# Patient Record
(demographics unavailable — no encounter records)

---

## 2024-10-30 NOTE — HISTORY OF PRESENT ILLNESS
[de-identified] : 9/4/2024 Mild gastritis, severe duodenitis with scattered small superficial ulcerations, approximately 1.5 cm nodular prominence with overlying mucosal inflammation in close proximity to ampulla, cannot rule out a submucosal lesion (bx --> minute fragment of superficial duodenal mucosa with granulation tissue, erosion and reactive epithelial cell changes) [de-identified] : 2022 -- CBD stone with stricture s/p stent placed and removed -- Dr. Christianson [FreeTextEntry1] : 2/13/2024 Gallbladder normal, no cholelithiasis or cholecystitis, no biliary dilation, no choledocholithiasis or stricture; unremarkable pancreas

## 2024-10-30 NOTE — ASSESSMENT
[FreeTextEntry1] : 68 yo male with approximately 1.5 cm nodular prominence with overlying mucosal inflammation in close proximity to ampulla, cannot rule out a submucosal lesion  - EGD-EUS with duodenoscope at [facility] - D/w pt regarding escort post-procedure - Risks of the procedure including bleeding, perforation, etc d/w the patient - NPO after midnight

## 2024-10-30 NOTE — REASON FOR VISIT
[Initial Evaluation] : an initial evaluation [[employer] ] : provided by [employer]   [Interpreters_IDNumber] : [id number] [Interpreters_FullName] : Carlos [TWNoteComboBox1] : Japanese

## 2025-03-12 NOTE — ASSESSMENT
[FreeTextEntry1] : 69 yo male with hx of chronic pancreatitis, presents for f/u post EGD/EUS 12/9/24 and CT 12/31/24 with findings of calcified 2.4 x 1.2 cm structure in the pancreatic head likely reflect sequela of chronic pancreatitis.   #R sided Pain/#Chronic Pancreatitis/#Structure in Pancreatic Head on Recent CT/#Prominent Papilla on recent CT -CA 19-9 ordered and hereditary pancreatic panel -Creon dose reviewed and in chart: pt states he takes 3 pills/daily -Pancreatic fecal elastase ordered to assess need for cx in Creon dosing  #Recent weakness, N/V/D, feverish feeling -C/o of these symptoms over the past week -Explained to pt he most likely has/had viral illness -Instructed to f/u with our office or PCP if s/s do not improve over next week  F/u in 3 months  IDenisa NP, am scribing for and in the presence of Dr. Colin Ohara the following sections: history of present illness, past medical/family/social history; review of systems; vital signs; physical exam; disposition.  IColin MD, personally performed the services described in the documentation, reviewed the documentation recorded by the scribe in my presence and it accurately and completely records my words and actions.

## 2025-03-12 NOTE — REASON FOR VISIT
[Follow-up] : a follow-up of an existing diagnosis [[employer] ] : provided by [employer]   [Interpreters_IDNumber] : [id number] [Interpreters_FullName] : Galilea [TWNoteComboBox1] : Botswanan

## 2025-03-12 NOTE — REVIEW OF SYSTEMS
[Abdominal Pain] : abdominal pain [Vomiting] : vomiting [Diarrhea] : diarrhea [Heartburn] : heartburn [Fever] : no fever [Chills] : no chills [Recent Weight Gain (___ Lbs)] : no recent weight gain [Recent Weight Loss (___ Lbs)] : no recent weight loss [Constipation] : no constipation [Melena (black stool)] : no melena [Bleeding] : no bleeding [Fecal Incontinence (soiling)] : no fecal incontinence [Bloating (gassiness)] : no bloating

## 2025-04-16 NOTE — LETTER BODY
[Dear  ___] : Dear  [unfilled], [Consult Letter:] : I had the pleasure of evaluating your patient, [unfilled]. [Please see my note below.] : Please see my note below. [Consult Closing:] : Thank you very much for allowing me to participate in the care of this patient.  If you have any questions, please do not hesitate to contact me. [Sincerely,] : Sincerely, [FreeTextEntry3] : Michael Manjarrez MD

## 2025-04-16 NOTE — REVIEW OF SYSTEMS
[Dry Eyes] : dryness of the eyes [Abdominal Pain] : abdominal pain [Heartburn] : heartburn [see HPI] : see HPI [Painful [place]] : painful [place] [Loss of interest] : loss of interest in sexual activity [No erections] : no erections [Joint Pain] : joint pain [Negative] : Heme/Lymph

## 2025-04-16 NOTE — HISTORY OF PRESENT ILLNESS
[FreeTextEntry1] : Cristy Paul is a 67 year old male c/o dysuria for last 6 months, treated with Cipro in Sep. 2021. Dysuria is initial and terminal. no voiding problems.  Has h/o Left nephrectomy for Renal cell ca 1998 at [facility]  ED: for last 2 years. Tried penile injection. It worked. Subsequently daily oral med was prescribed. Did not help much.  Wants check up for prostate, on Tamsulosin.   03/22/2022: Had an episode of UGI bleed and pancreatic duct obstruction, managed at [facility]. CT : no renal mass.  PSA is normal. Voided prior to coming here, so Uroflow and bladder scan will be done on next visit.   05/25/22: Here for follow up for ED and BPH. Pt. has not using ED medication due to not having any opportunity to have sex.  BPH:  N x 2.  Pt. is able to void,and his stream is slow. PVR : 76.. Will continue to take Tamsulosin.  follow up in 3 months for : UA, culture, uroflow and bladder scan.    09/09/2022: Mr. SEVILLA is a 67 year male who presents today for a follow up for Enlarged prostate with LUTS. Urinary flow is good  . Patient is voiding and emptying bladder well. PVR: 34.  N x 2 secondary to fluid intake.   He is complaining of burning urination . Burning is in the beginning and end of urination. For last 2 months. Has finished the course of Augmentin for tooth work. No effect on burning. IVAN: non tender prostate. UA and culture done. Will start on Cipro 500 mg po bid for 7 days.   O/E: uncircumcised phallus, adequate meatus, both testes descended, nontender, no mass palpable IVAN: non tender , non nodular prostate.   RTC: 2 weeks Urinalysis and Culture    09/22/2022: Mr. SEVILLA is a 67 year male who presents today for a follow up for persist burning in urination for last 1 month. . Treated with Cipro for a week w/o improvement. UA and Culture no infection. On Tamsulosin. PVR: 34 cc. Has h/o renal stones.  Will schedule for renal sonogram and Cystoscopy with possible biopsy and fulguration to evaluate the cause of persistent burning.  RTC: 1 week:  renal sonogram and Cystoscopy with possible biopsy and fulguration   10/05/2022:Mr. SEVILLA is a 67 year male who presents today for a follow up for persist burning in urination for last 1 month. . Treated with Cipro for a week w/o improvement. UA and Culture no infection. On Tamsulosin. PVR: 34 cc. Has h/o renal stones.   Cystoscopy: no tumor or stone. Clear efflux from right kidney.  Renal sonogram:  Right renal cyst. S/P left nephrectomy for Ca.   UA: persistent glycosuria. Pt states that he is on some anti diabetic medicine for being prediabetic. Does not know the name.   RTC; 1 month for Follow up : UA, Culture, Uro -flow, and PVR   11/09/2022: Mr. SEVILLA is a 67 year male who presents today for a follow up for burning on urination before starts voiding and relieved after voiding. UA , culture ,cystoscopy: normal.  Enlarged prostate with LUTS. had PVR 74 ml. On Tamsulosin now PVR 0 ml.  Patient is voiding and emptying bladder completely. PVR: 0 cc Urinary stream is good. Voided volume 129 cc.  All meds reviewed. Explained to him about burning, no infection , no stone.  May be related to certain foods.  Quality of life issue, no health problem.   Follow up 6 months : PSA, UA, culture, uroflow and bladder scan     04/05/2023: Mr. SEVILLA is a 68 year male who presents today for a follow up for Enlarged prostate with LUTS and ED  Enlarged prostate with LUTS : On Tamsulosin. He denies hematuria, dysuria, urgency and hesitancy. Patient is voiding and emptying bladder well. PVR: 30 cc. Doing well on Tamsulosin. Will continue to take.Burning much improved.  ED: no issues currently.   RTC: 6 months : Follow up : UA, Culture, Uro -flow, and PVR        10/04/2023: Mr. SEVILLA is a 68 year male who presents today for a follow up for Enlarged prostate with LUTS  S/P Left nephrectomy for Ca kidney 1998 at [facility].  On Tamsulosin  Flow is great. Voided 141 cc  Patient is voiding but not emptying bladder completely: PVR: 41 Denies hematuria, dysuria, urgency and hesitancy Doing well on Tamsulosin  Will continue    RTC: 6 months UA, Culture, Uro -flow, and PVR     04/16/2024: s/p left nephrectomy for Ca kidney 1998 at [facility].  Voiding well. Stopped Tamsulosin 2 months ago. Urine flow is good.  Uroflow is good. PVR: negligible. Discontinue Tamsulosin   RTC: 1 yr for Follow up: PSA, UA, culture, uroflow and bladder scan      04/16/2025, 69 y/o male presents with a follow up visit for BPH w LUTS  S/p left nephrectomy for Ca Kidney 1998 at [facility].   No medications at present, patient is doing well. Occasional Nocturia 2-3x secondary to increased fluid intake before bed. Patient denies any frequency, urgency, dysuria or hematuria.   Uroflow  Maximum Flow	19.7	 	 	  	Average Flow	12.7	 	 	  	Voiding Time	24.8	 	 	  	Flow Time	18.9	 	 	  	Time to Max Flow	6.6	 	 	  	Voided Volume	240 ml  PVR 27cc   Patient is voiding and emptying his bladder well. No intervention necessary   He s advised to modify water intake before bed.   RTC 1 year follow up for visit PSA, Uroflow, PVR, and UA and Culture

## 2025-04-16 NOTE — END OF VISIT
[Time Spent: ___ minutes] : I have spent [unfilled] minutes of time on the encounter which excludes teaching and separately reported services. [FreeTextEntry4] :  This note was written by Christina Moraes on 04/16/2025 actively solely Michael Kerr M.D. I, Christina Moraes, am scribing for and in the presence of Michael Kerr M.D. in the following sections HISTORY OF PRESENT ILLNESS, PAST MEDICAL/FAMILY/SOCIAL HISTORY; REVIEW OF SYSTEMS; VITAL SIGNS; PHYSICAL EXAM; ASSESSMENT/PLAN.     All medical record entries made by this scribe at my, Michael Kerr M.D. direction and personally dictated by me on 04/16/2025. I personally performed the services described in the documentation, reviewed the documentation recorded by the scribe in my presence, and it accurately and completely records my words and actions.

## 2025-04-16 NOTE — PHYSICAL EXAM
[Normal Appearance] : normal appearance [Well Groomed] : well groomed [General Appearance - In No Acute Distress] : no acute distress [Edema] : no peripheral edema [Respiration, Rhythm And Depth] : normal respiratory rhythm and effort [Exaggerated Use Of Accessory Muscles For Inspiration] : no accessory muscle use [Abdomen Soft] : soft [Abdomen Tenderness] : non-tender [Costovertebral Angle Tenderness] : no ~M costovertebral angle tenderness [Urinary Bladder Findings] : the bladder was normal on palpation [Normal Station and Gait] : the gait and station were normal for the patient's age [] : no rash [No Focal Deficits] : no focal deficits [Oriented To Time, Place, And Person] : oriented to person, place, and time [Mood] : the mood was normal [Affect] : the affect was normal [No Palpable Adenopathy] : no palpable adenopathy [Chaperone Present] : A chaperone was present in the examining room during all aspects of the physical examination [FreeTextEntry2] :  Christina Moraes

## 2025-04-30 NOTE — REASON FOR VISIT
[Follow-up] : a follow-up of an existing diagnosis [Interpreters_IDNumber] : [id number] [Interpreters_FullName] : Clary Zhao [TWNoteComboBox1] : Cambodian

## 2025-04-30 NOTE — HISTORY OF PRESENT ILLNESS
[de-identified] : 12/9/24 Findings: Examination with radial echoendoscope. The distal esophagus appeared normal endosonographically. The celiac axis appeared normal, no lymphadenopathy noted. The gallbladder appears normal with no stones or sludge visible. The duodenal wall appears thickened to approximately 4 mm. The pancreatic neck, body and tail appeared to be of heterogeneous echogenicity with no masses, cysts or calcifications seen. The pancreas was more hypoechoic with calcifications in the head of the pancreas and at the ampulla. The pancreatic duct was not dilated measuring 2 mm in the body, neck and tail. Difficult to visualize the bile duct and the pancreatic duct in the head secondary to the duodenal wall edema. No duodenal or pancreatic masses noted.  [de-identified] : 2022 -- CBD stone with stricture s/p stent placed and removed -- Dr. Christianson. [de-identified] : 12/31/24 CT Impression: -Calcified 2.4 x 1.2 cm structure in the pancreatic head likely reflect sequela of chronic pancreatitis. Underlying mass is not excluded. -Prominent papilla. ERCP may be of benefit. -Post left nephrectomy. No definite residual mass is seen -Indeterminate adrenal nodules.   [FreeTextEntry1] : 2/13/2024 Gallbladder normal, no cholelithiasis or cholecystitis, no biliary dilation, no choledocholithiasis or stricture; unremarkable pancreas.

## 2025-04-30 NOTE — PHYSICAL EXAM
[Abdomen Tenderness] : non-tender [No Masses] : no abdominal mass palpated [Normal] : oriented to person, place, and time [Abdomen Soft] : soft

## 2025-04-30 NOTE — ASSESSMENT
[FreeTextEntry1] : 71 yo male with chronic pancreatitis here for f/u  - Can stop Creon as fecal elastase is nl - F/u with Dr. Esposito for a repeat colonoscopy - Will have pt f/u in 3 months -- will bring ERCP report, prior colonoscopy report, etc. - Will consider repeat imaging at next visit